# Patient Record
Sex: MALE | Race: BLACK OR AFRICAN AMERICAN
[De-identification: names, ages, dates, MRNs, and addresses within clinical notes are randomized per-mention and may not be internally consistent; named-entity substitution may affect disease eponyms.]

---

## 2019-04-26 ENCOUNTER — HOSPITAL ENCOUNTER (EMERGENCY)
Dept: HOSPITAL 74 - FER | Age: 24
Discharge: HOME | End: 2019-04-26
Payer: COMMERCIAL

## 2019-04-26 VITALS — BODY MASS INDEX: 42 KG/M2

## 2019-04-26 VITALS — TEMPERATURE: 99.4 F | DIASTOLIC BLOOD PRESSURE: 77 MMHG | HEART RATE: 103 BPM | SYSTOLIC BLOOD PRESSURE: 137 MMHG

## 2019-04-26 DIAGNOSIS — J02.8: Primary | ICD-10-CM

## 2019-04-26 NOTE — PDOC
History of Present Illness





- General


Chief Complaint: Sore Throat


Stated Complaint: SORE THROAT


Time Seen by Provider: 04/26/19 01:13


History Source: Patient


Exam Limitations: No Limitations





- History of Present Illness


Initial Comments: 





04/26/19 01:15


This is a 24-year-old male who comes in complaining of sore throat times one 

day. Patient denies any fevers or chills. Patient is concerned that he has 

strep throat because there is pus on his tonsils. Patient denies any other 

complaints.





Allergies: as per nursing notes


Past Medical History: none


Social history: Lives with family. No smoking. No alcohol. No illicit drugs.


Surgical history: None








General:  No fevers or chills, no weakness, no weight loss 


HEENT: No change in vision.  + sore throat,. No ear pain


CardioVascular: no chest discomfort. No shortness of breath


Respiratory:No cough, or wheezing. 


Gastrointestinal:  no nausea, vomiting, diarrhea or constipation,  No rectal 

bleeding


Genitourinary:  No dysuria, hematuria, or frequency


Musculoskeletal:  No joint or muscle pain or swelling


Neurologic: No headache, vertigo, dizziness or loss of consciousness


Psychiatric: nor depression 


Skin: No rashes or easy bruising


Endocrine: no increased thirst or abnormal weight change


Allergic: no skin or latex allergy


All other systems reviewed and normal








GENERAL: The patient is awake, alert, and fully 


oriented, in no acute distress.


HEAD: Normal with no signs of trauma.


THROAT: There is an exudative pharyngitis of the posterior oropharynx. Tonsils 

are slightly enlarged. There is small bilateral cerumen tubular 

lymphadenopathy. There is no nuchal rigidity or meningeal signs.


EYES: Pupils equal, round and reactive to light, extraocular movements intact, 

sclera anicteric, conjunctiva clear.


EXTREMITIES:atraumatic, Normal range of motion, no edema.


NEUROLOGICAL: Normal speech, normal gait.


PSYCH: Normal mood, normal affect.


SKIN: Warm, Dry, normal turgor, no rashes or lesions noted.





Assessment plan: This is a 22-year-old male comes in complaining of a sore 

throat for 1 day. Patient had a rapid strep that was negative patient was 

reassured that this most likely viral however a Monospot was also sent. Patient 

has a primary care doctor to follow up with. Patient discharged home with his 

mother. 








Past History





- Past Medical History


Allergies/Adverse Reactions: 


 Allergies











Allergy/AdvReac Type Severity Reaction Status Date / Time


 


No Known Allergies Allergy   Unverified 04/26/19 01:12











Home Medications: 


Ambulatory Orders





Albuterol Sulfate [Proair Hfa] 8.5 gm IH PRN PRN 04/26/19 











*DC/Admit/Observation/Transfer


Diagnosis at time of Disposition: 


 Viral pharyngitis








- Discharge Dispostion


Disposition: HOME


Condition at time of disposition: Stable


Decision to Admit order: No





- Referrals





- Patient Instructions


Additional Instructions: 


Tylenol or Motrin as needed for pain.





Your mono test will not be available until next week.





Return to the emergency department immediately with ANY new, persistent or 

worsening symptoms.





Continue any medications as previously prescribed by your physician.





You should follow up with your primary doctor as soon as possible regarding 

today's emergency department visit.


.


Please make sure your doctor reviews the results of your emergency evaluation.





Thank you for coming to the   Emergency Department today for your care. It was 

a pleasure to see you today. Please note that your evaluation is INCOMPLETE 

until you  follow-up with your doctor. 





- Post Discharge Activity


Forms/Work/School Notes:  Back to Work

## 2023-01-27 ENCOUNTER — OUTPATIENT (OUTPATIENT)
Dept: OUTPATIENT SERVICES | Facility: HOSPITAL | Age: 28
LOS: 1 days | Discharge: HOME | End: 2023-01-27

## 2023-01-30 DIAGNOSIS — K02.9 DENTAL CARIES, UNSPECIFIED: ICD-10-CM

## 2023-06-20 ENCOUNTER — HOSPITAL ENCOUNTER (EMERGENCY)
Dept: HOSPITAL 74 - FER | Age: 28
LOS: 1 days | Discharge: HOME | End: 2023-06-21
Payer: COMMERCIAL

## 2023-06-20 VITALS — BODY MASS INDEX: 53.8 KG/M2

## 2023-06-20 VITALS
TEMPERATURE: 98.8 F | DIASTOLIC BLOOD PRESSURE: 108 MMHG | HEART RATE: 86 BPM | RESPIRATION RATE: 18 BRPM | SYSTOLIC BLOOD PRESSURE: 153 MMHG

## 2023-06-20 DIAGNOSIS — R42: Primary | ICD-10-CM

## 2024-10-05 ENCOUNTER — HOSPITAL ENCOUNTER (EMERGENCY)
Dept: HOSPITAL 74 - FER | Age: 29
Discharge: HOME | End: 2024-10-05
Payer: COMMERCIAL

## 2024-10-05 VITALS
TEMPERATURE: 98.2 F | SYSTOLIC BLOOD PRESSURE: 149 MMHG | RESPIRATION RATE: 20 BRPM | DIASTOLIC BLOOD PRESSURE: 98 MMHG | HEART RATE: 73 BPM

## 2024-10-05 VITALS — BODY MASS INDEX: 53.8 KG/M2

## 2024-10-05 DIAGNOSIS — B34.9: ICD-10-CM

## 2024-10-05 DIAGNOSIS — R05.9: ICD-10-CM

## 2024-10-05 DIAGNOSIS — R09.81: ICD-10-CM

## 2024-10-05 DIAGNOSIS — R51.9: Primary | ICD-10-CM

## 2024-10-05 RX ADMIN — ACETAMINOPHEN ONE MG: 325 TABLET ORAL at 07:52
